# Patient Record
Sex: MALE | Race: WHITE | HISPANIC OR LATINO | URBAN - METROPOLITAN AREA
[De-identification: names, ages, dates, MRNs, and addresses within clinical notes are randomized per-mention and may not be internally consistent; named-entity substitution may affect disease eponyms.]

---

## 2017-11-07 ENCOUNTER — OFFICE VISIT (OUTPATIENT)
Dept: BEHAVIORAL HEALTH | Facility: PHYSICIAN GROUP | Age: 22
End: 2017-11-07
Payer: COMMERCIAL

## 2017-11-07 DIAGNOSIS — F33.42 MDD (MAJOR DEPRESSIVE DISORDER), RECURRENT, IN FULL REMISSION (HCC): ICD-10-CM

## 2017-11-07 DIAGNOSIS — F10.21 ALCOHOL USE DISORDER, SEVERE, IN EARLY REMISSION (HCC): ICD-10-CM

## 2017-11-07 DIAGNOSIS — F41.1 GAD (GENERALIZED ANXIETY DISORDER): ICD-10-CM

## 2017-11-07 PROCEDURE — 99204 OFFICE O/P NEW MOD 45 MIN: CPT | Performed by: STUDENT IN AN ORGANIZED HEALTH CARE EDUCATION/TRAINING PROGRAM

## 2017-11-07 RX ORDER — FLUOXETINE HYDROCHLORIDE 40 MG/1
40 CAPSULE ORAL DAILY
Qty: 90 CAP | Refills: 1 | Status: SHIPPED | OUTPATIENT
Start: 2017-11-07 | End: 2017-11-07 | Stop reason: SDUPTHER

## 2017-11-07 RX ORDER — FLUOXETINE HYDROCHLORIDE 40 MG/1
40 CAPSULE ORAL DAILY
Qty: 90 CAP | Refills: 1 | Status: SHIPPED | OUTPATIENT
Start: 2017-11-07

## 2017-11-07 RX ORDER — NALTREXONE HYDROCHLORIDE 50 MG/1
TABLET, FILM COATED ORAL
Qty: 30 TAB | Refills: 1 | Status: SHIPPED | OUTPATIENT
Start: 2017-11-07

## 2017-11-07 NOTE — PROGRESS NOTES
PSYCHIATRIC Evaluation:    Supervising Physician:     Dr. Karo Vieyra    ID: 21 y/o male with significant hx of depression and anxiety, seen for new establishment visit today.      Chief Complaint: refill meds, new establishment    CURRENT PSYCHOTROPIC MEDS:  -fluoxetine 40mg PO QDaily    HPI: Says he was previously seeing a psychiatrist Sr. Deondre Kinsey for 4 years until recently retiring back a few months back. Says that he has been trying to get established with psychiatric for his mental health, which mostly consists of depression/anxiety/alcohol dependence. Says that he is stable with his anxiety and depression with fluoxetine at 40mg daily. Currently attending AA meetings, 2 days short from achieving 30 day 'chip' , & currently has a sponsor.    Says that his most recent DUI (2nd DUI in total)  about 2 months ago was an eye opener and that is when the  told him that if he met certain requirements then he would not serve any halfway time ( DUI with accident. BAL .34 which is 4X legal limit) which includes getting re-established with psychiatry/psychology/AA meetings and staying sober. Currently seeing therapist on [facility] in [place], [place] who specializes with alcohol/drug abuse.     Otherwise no problems with sleep or appetite, willing to trial naltrexone for alcohol cravings today. Will refill meds today and f/u 3 months      Psychiatric Review of Systems:current symptoms as reported by pt.  Depression:      No current symptoms of Depression. Hx of low interest, low energy, +guilty feelings, psychomotor retardation/isolation, poor sleep.   Day: No previous hx of day  Anxiety/Panic Attacks - hx of anxiety that is worse with depression , generalized in nature  PTSD symptom: no hx of PTSD symptoms  Psychosis: No hx of psychotic symptoms       Medical Review of Systems: as reported by pt. All systems reviewed. Only those found to be + are noted below. All others are negative.   Neurological:    TBIs: denies  "   SZs: denies   Strokes: denies    Other medical symptoms:   Thyroid: denies   Diabetes: denies   Cardiovascular disease: denies    Psychiatric Examination: observed phenomenon:     Musculoskeletal(abnormal movements, gait, etc): no abnormal movements observed  Appearance: young tall male, appropriately groomed, answering questions appropriately   Thoughts: linear, organized  Speech:RRR  Mood:  good  Affect:         euthymic  SI/HI:   Denies/denies  Attention/Alertness:   alert  Memory:    Grossly intact  Orientation:    To person, place, time, and situation intact  Fund of Knowledge:    Grossly intact  Insight/Judgement into symptoms: improved/improved        Past Psychiatric Hx:   -Previous hx of suicidal attempt at 14 years old- says she tried to shoot himself but the gun misfired \"hangfire bullet\" - subsequently admitted to [facility] for 3 weeks and followed up with sara holbrook thereafter. Says that he was having difficult time with passing his classes in school, recently broke up with girlfriend, and watching his parents going through marital problems.   -Previous hx of therapy X 2. 1 year previously  and most currently 4 months with alcohol/drug therapist.  -denies hx of self-injurious behaviors    PREVIOUS PSYCHOTROPIC MEDS HX  - unknown     Family Psychiatric Hx:  -2 uncles from mother side both alcoholics. 2 uncles from fathers side both functional alcoholics.  -1 sister with hx of alcoholism, sober X 2 years.     Social Hx:  -Lives with mother and Father in [place], born [place] raised.   -1 semester left at [employer] to become .   -Graduated highschool and has taken general ed. 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       Drug/Alcohol/Tobacco Hx:   Drugs: no hx of drug use    Alcohol: hx of alcoholism   Tobacco: denies    Medical Hx: labs, MARS, medications, etc were reviewed. Only those findings of potential interest to psychiatry are noted below:  Medical Conditions:  "  Hx of Type 1 Diabetes  Allergies: penicillin -hives  Medications (currently prescribed at [facility]): none  Labs: no current labs   ECG: n/a    Cranial Imaging: none on file         ASSESSMENT/PLAN:  21 y/o male with long hx of depression and anxiety currently treated with fluoxetine. Appears to be doing very well on current regiment regarding depression and anxiety. Due to most recent DUI, patient has started individual therapy with an alcohol and drug abuse expert, including going to Alcoholics Anonymous Meetings, says he is a few days shy from 30 day sober 'chip,' currently has a sponsor. Willing to trial naltrexone for alcohol cravings. Will refill meds today and f/u in 3 months.     #MDD, in sustained remission  #JOLENE  #Alcohol use disorder    -Cont. Fluoxetine 40mg PO QDaily  -Trial naltrexone 50mg PO QDaily for alcohol cravings  -cont individual therapy and AA meetings to help maintain sobreity, currently has a sponsor   -f/u 3 months

## 2020-08-19 ENCOUNTER — HOSPITAL ENCOUNTER (OUTPATIENT)
Dept: HOSPITAL 8 - RAD | Age: 25
Discharge: HOME | End: 2020-08-19
Attending: INTERNAL MEDICINE
Payer: COMMERCIAL

## 2020-08-19 DIAGNOSIS — R11.0: ICD-10-CM

## 2020-08-19 DIAGNOSIS — K31.89: ICD-10-CM

## 2020-08-19 DIAGNOSIS — K76.6: ICD-10-CM

## 2020-08-19 DIAGNOSIS — K76.0: Primary | ICD-10-CM

## 2020-08-19 PROCEDURE — A9541 TC99M SULFUR COLLOID: HCPCS

## 2020-08-19 PROCEDURE — 76700 US EXAM ABDOM COMPLETE: CPT

## 2020-08-19 PROCEDURE — 78264 GASTRIC EMPTYING IMG STUDY: CPT

## 2020-09-29 ENCOUNTER — HOSPITAL ENCOUNTER (OUTPATIENT)
Dept: HOSPITAL 8 - LAB | Age: 25
Discharge: HOME | End: 2020-09-29
Attending: NURSE PRACTITIONER
Payer: COMMERCIAL

## 2020-09-29 DIAGNOSIS — I10: ICD-10-CM

## 2020-09-29 DIAGNOSIS — E03.9: ICD-10-CM

## 2020-09-29 DIAGNOSIS — R79.0: ICD-10-CM

## 2020-09-29 DIAGNOSIS — E78.1: ICD-10-CM

## 2020-09-29 DIAGNOSIS — E55.9: ICD-10-CM

## 2020-09-29 DIAGNOSIS — R79.9: ICD-10-CM

## 2020-09-29 DIAGNOSIS — E53.9: ICD-10-CM

## 2020-09-29 DIAGNOSIS — R89.4: ICD-10-CM

## 2020-09-29 DIAGNOSIS — R06.83: ICD-10-CM

## 2020-09-29 DIAGNOSIS — Z13.220: Primary | ICD-10-CM

## 2020-09-29 DIAGNOSIS — E10.65: ICD-10-CM

## 2020-09-29 LAB
% IRON SATURATION: 83 % (ref 20–55)
ALBUMIN SERPL-MCNC: 3.3 G/DL (ref 3.4–5)
ALP SERPL-CCNC: 120 U/L (ref 45–117)
ALT SERPL-CCNC: 80 U/L (ref 12–78)
ANION GAP SERPL CALC-SCNC: 9 MMOL/L (ref 5–15)
BASOPHILS # BLD AUTO: 0.02 X10^3/UL (ref 0–0.1)
BASOPHILS NFR BLD AUTO: 1 % (ref 0–1)
BILIRUB SERPL-MCNC: 0.7 MG/DL (ref 0.2–1)
CALCIUM SERPL-MCNC: 8.9 MG/DL (ref 8.5–10.1)
CHLORIDE SERPL-SCNC: 111 MMOL/L (ref 98–107)
CREAT SERPL-MCNC: 0.82 MG/DL (ref 0.7–1.3)
EOSINOPHIL # BLD AUTO: 0.04 X10^3/UL (ref 0–0.4)
EOSINOPHIL NFR BLD AUTO: 1 % (ref 1–7)
ERYTHROCYTE [DISTWIDTH] IN BLOOD BY AUTOMATED COUNT: 14.5 % (ref 9.4–14.8)
FOLATE SERPL-MCNC: 6.6 NG/ML (ref 3.1–17.5)
IRON LEVEL: 97 MCG/DL (ref 65–175)
LYMPHOCYTES # BLD AUTO: 1.24 X10^3/UL (ref 1–3.4)
LYMPHOCYTES NFR BLD AUTO: 31 % (ref 22–44)
MCH RBC QN AUTO: 35.9 PG (ref 27.5–34.5)
MCHC RBC AUTO-ENTMCNC: 33 G/DL (ref 33.2–36.2)
MD: NO
MONOCYTES # BLD AUTO: 0.25 X10^3/UL (ref 0.2–0.8)
MONOCYTES NFR BLD AUTO: 6 % (ref 2–9)
NEUTROPHILS # BLD AUTO: 2.39 X10^3/UL (ref 1.8–6.8)
NEUTROPHILS NFR BLD AUTO: 61 % (ref 42–75)
PLATELET # BLD AUTO: 236 X10^3/UL (ref 130–400)
PMV BLD AUTO: 7.7 FL (ref 7.4–10.4)
PROT SERPL-MCNC: 6.5 G/DL (ref 6.4–8.2)
RBC # BLD AUTO: 4.31 X10^6/UL (ref 4.38–5.82)
TIBC SERPL-MCNC: 117 MCG/DL (ref 250–450)
TRANSFERRIN SERPL-MCNC: 100 MG/DL (ref 200–360)

## 2020-09-29 PROCEDURE — 82746 ASSAY OF FOLIC ACID SERUM: CPT

## 2020-09-29 PROCEDURE — 84425 ASSAY OF VITAMIN B-1: CPT

## 2020-09-29 PROCEDURE — 80053 COMPREHEN METABOLIC PANEL: CPT

## 2020-09-29 PROCEDURE — 82728 ASSAY OF FERRITIN: CPT

## 2020-09-29 PROCEDURE — 84466 ASSAY OF TRANSFERRIN: CPT

## 2020-09-29 PROCEDURE — 83540 ASSAY OF IRON: CPT

## 2020-09-29 PROCEDURE — 84100 ASSAY OF PHOSPHORUS: CPT

## 2020-09-29 PROCEDURE — 83550 IRON BINDING TEST: CPT

## 2020-09-29 PROCEDURE — 36415 COLL VENOUS BLD VENIPUNCTURE: CPT

## 2020-09-29 PROCEDURE — 84207 ASSAY OF VITAMIN B-6: CPT

## 2020-09-29 PROCEDURE — 85025 COMPLETE CBC W/AUTO DIFF WBC: CPT

## 2020-09-29 PROCEDURE — 82525 ASSAY OF COPPER: CPT

## 2020-09-29 PROCEDURE — 82607 VITAMIN B-12: CPT

## 2020-09-29 PROCEDURE — 83735 ASSAY OF MAGNESIUM: CPT

## 2021-02-11 ENCOUNTER — HOSPITAL ENCOUNTER (OUTPATIENT)
Dept: HOSPITAL 8 - LAB | Age: 26
Discharge: HOME | End: 2021-02-11
Attending: NURSE PRACTITIONER
Payer: COMMERCIAL

## 2021-02-11 DIAGNOSIS — E53.9: ICD-10-CM

## 2021-02-11 DIAGNOSIS — E03.9: ICD-10-CM

## 2021-02-11 DIAGNOSIS — E55.9: ICD-10-CM

## 2021-02-11 DIAGNOSIS — I10: ICD-10-CM

## 2021-02-11 DIAGNOSIS — R06.83: ICD-10-CM

## 2021-02-11 DIAGNOSIS — E04.1: ICD-10-CM

## 2021-02-11 DIAGNOSIS — E10.65: ICD-10-CM

## 2021-02-11 DIAGNOSIS — E78.1: ICD-10-CM

## 2021-02-11 DIAGNOSIS — R79.9: ICD-10-CM

## 2021-02-11 DIAGNOSIS — Z13.220: Primary | ICD-10-CM

## 2021-02-11 LAB
% IRON SATURATION: 37 % (ref 20–55)
ALBUMIN SERPL-MCNC: 3.9 G/DL (ref 3.4–5)
ALP SERPL-CCNC: 139 U/L (ref 45–117)
ALT SERPL-CCNC: 48 U/L (ref 12–78)
ANION GAP SERPL CALC-SCNC: 7 MMOL/L (ref 5–15)
BASOPHILS # BLD AUTO: 0 X10^3/UL (ref 0–0.1)
BASOPHILS NFR BLD AUTO: 1 % (ref 0–1)
BILIRUB SERPL-MCNC: 0.8 MG/DL (ref 0.2–1)
CALCIUM SERPL-MCNC: 9 MG/DL (ref 8.5–10.1)
CHLORIDE SERPL-SCNC: 107 MMOL/L (ref 98–107)
CREAT SERPL-MCNC: 0.74 MG/DL (ref 0.7–1.3)
EOSINOPHIL # BLD AUTO: 0 X10^3/UL (ref 0–0.4)
EOSINOPHIL NFR BLD AUTO: 1 % (ref 1–7)
ERYTHROCYTE [DISTWIDTH] IN BLOOD BY AUTOMATED COUNT: 14.4 % (ref 9.4–14.8)
EST. AVERAGE GLUCOSE BLD GHB EST-MCNC: 131 MG/DL (ref 0–126)
FOLATE SERPL-MCNC: 2.2 NG/ML (ref 3.1–17.5)
IRON LEVEL: 80 MCG/DL (ref 65–175)
LYMPHOCYTES # BLD AUTO: 1.4 X10^3/UL (ref 1–3.4)
LYMPHOCYTES NFR BLD AUTO: 40 % (ref 22–44)
MCH RBC QN AUTO: 34.5 PG (ref 27.5–34.5)
MCHC RBC AUTO-ENTMCNC: 34.7 G/DL (ref 33.2–36.2)
MD: NO
MICROSCOPIC: (no result)
MONOCYTES # BLD AUTO: 0.2 X10^3/UL (ref 0.2–0.8)
MONOCYTES NFR BLD AUTO: 6 % (ref 2–9)
NEUTROPHILS # BLD AUTO: 1.8 X10^3/UL (ref 1.8–6.8)
NEUTROPHILS NFR BLD AUTO: 52 % (ref 42–75)
PLATELET # BLD AUTO: 233 X10^3/UL (ref 130–400)
PMV BLD AUTO: 7.2 FL (ref 7.4–10.4)
PROT SERPL-MCNC: 7.3 G/DL (ref 6.4–8.2)
RBC # BLD AUTO: 4.77 X10^6/UL (ref 4.38–5.82)
T4 FREE SERPL-MCNC: 0.85 NG/DL (ref 0.76–1.46)
TIBC SERPL-MCNC: 216 MCG/DL (ref 250–450)
TRANSFERRIN SERPL-MCNC: 178 MG/DL (ref 200–360)

## 2021-02-11 PROCEDURE — 82607 VITAMIN B-12: CPT

## 2021-02-11 PROCEDURE — 83036 HEMOGLOBIN GLYCOSYLATED A1C: CPT

## 2021-02-11 PROCEDURE — 84630 ASSAY OF ZINC: CPT

## 2021-02-11 PROCEDURE — 82525 ASSAY OF COPPER: CPT

## 2021-02-11 PROCEDURE — 84481 FREE ASSAY (FT-3): CPT

## 2021-02-11 PROCEDURE — 84207 ASSAY OF VITAMIN B-6: CPT

## 2021-02-11 PROCEDURE — 80061 LIPID PANEL: CPT

## 2021-02-11 PROCEDURE — 81003 URINALYSIS AUTO W/O SCOPE: CPT

## 2021-02-11 PROCEDURE — 83550 IRON BINDING TEST: CPT

## 2021-02-11 PROCEDURE — 84466 ASSAY OF TRANSFERRIN: CPT

## 2021-02-11 PROCEDURE — 82728 ASSAY OF FERRITIN: CPT

## 2021-02-11 PROCEDURE — 84439 ASSAY OF FREE THYROXINE: CPT

## 2021-02-11 PROCEDURE — 80053 COMPREHEN METABOLIC PANEL: CPT

## 2021-02-11 PROCEDURE — 84402 ASSAY OF FREE TESTOSTERONE: CPT

## 2021-02-11 PROCEDURE — 84403 ASSAY OF TOTAL TESTOSTERONE: CPT

## 2021-02-11 PROCEDURE — 85025 COMPLETE CBC W/AUTO DIFF WBC: CPT

## 2021-02-11 PROCEDURE — 83704 LIPOPROTEIN BLD QUAN PART: CPT

## 2021-02-11 PROCEDURE — 82652 VIT D 1 25-DIHYDROXY: CPT

## 2021-02-11 PROCEDURE — 36415 COLL VENOUS BLD VENIPUNCTURE: CPT

## 2021-02-11 PROCEDURE — 84100 ASSAY OF PHOSPHORUS: CPT

## 2021-02-11 PROCEDURE — 83540 ASSAY OF IRON: CPT

## 2021-02-11 PROCEDURE — 84443 ASSAY THYROID STIM HORMONE: CPT

## 2021-02-11 PROCEDURE — 84425 ASSAY OF VITAMIN B-1: CPT

## 2021-02-11 PROCEDURE — 82746 ASSAY OF FOLIC ACID SERUM: CPT
